# Patient Record
Sex: MALE | Race: WHITE | URBAN - METROPOLITAN AREA
[De-identification: names, ages, dates, MRNs, and addresses within clinical notes are randomized per-mention and may not be internally consistent; named-entity substitution may affect disease eponyms.]

---

## 2022-11-03 ENCOUNTER — APPOINTMENT (RX ONLY)
Dept: URBAN - METROPOLITAN AREA CLINIC 49 | Facility: CLINIC | Age: 42
Setting detail: DERMATOLOGY
End: 2022-11-03

## 2022-11-03 DIAGNOSIS — L0391 CELLULITIS AND ABSCESS OF UNSPECIFIED SITES: ICD-10-CM

## 2022-11-03 DIAGNOSIS — L0390 CELLULITIS AND ABSCESS OF UNSPECIFIED SITES: ICD-10-CM

## 2022-11-03 PROBLEM — L02.91 CUTANEOUS ABSCESS, UNSPECIFIED: Status: ACTIVE | Noted: 2022-11-03

## 2022-11-03 PROCEDURE — 99203 OFFICE O/P NEW LOW 30 MIN: CPT

## 2022-11-03 PROCEDURE — ? COUNSELING

## 2022-11-03 PROCEDURE — ? PRESCRIPTION MEDICATION MANAGEMENT

## 2022-11-03 PROCEDURE — ? PRESCRIPTION

## 2022-11-03 PROCEDURE — ? MEDICATION COUNSELING

## 2022-11-03 RX ORDER — CEPHALEXIN 500 MG/1
TABLET ORAL BID
Qty: 28 | Refills: 3 | Status: ERX | COMMUNITY
Start: 2022-11-03

## 2022-11-03 RX ADMIN — CEPHALEXIN: 500 TABLET ORAL at 00:00

## 2022-11-03 NOTE — HPI: CYST
How Severe Is Your Cyst?: mild
Is This A New Presentation, Or A Follow-Up?: Cyst
Additional History: Spot drained 10 days ago.

## 2022-11-03 NOTE — PROCEDURE: MEDICATION COUNSELING
Well , 10 Years Old  Well-child exams are recommended visits with a health care provider to track your child's growth and development at certain ages. This sheet tells you what to expect during this visit.  Recommended immunizations  · Tetanus and diphtheria toxoids and acellular pertussis (Tdap) vaccine. Children 7 years and older who are not fully immunized with diphtheria and tetanus toxoids and acellular pertussis (DTaP) vaccine:  ? Should receive 1 dose of Tdap as a catch-up vaccine. It does not matter how long ago the last dose of tetanus and diphtheria toxoid-containing vaccine was given.  ? Should receive the tetanus diphtheria (Td) vaccine if more catch-up doses are needed after the 1 Tdap dose.  · Your child may get doses of the following vaccines if needed to catch up on missed doses:  ? Hepatitis B vaccine.  ? Inactivated poliovirus vaccine.  ? Measles, mumps, and rubella (MMR) vaccine.  ? Varicella vaccine.  · Your child may get doses of the following vaccines if he or she has certain high-risk conditions:  ? Pneumococcal conjugate (PCV13) vaccine.  ? Pneumococcal polysaccharide (PPSV23) vaccine.  · Influenza vaccine (flu shot). A yearly (annual) flu shot is recommended.  · Hepatitis A vaccine. Children who did not receive the vaccine before 2 years of age should be given the vaccine only if they are at risk for infection, or if hepatitis A protection is desired.  · Meningococcal conjugate vaccine. Children who have certain high-risk conditions, are present during an outbreak, or are traveling to a country with a high rate of meningitis should be given this vaccine.  · Human papillomavirus (HPV) vaccine. Children should receive 2 doses of this vaccine when they are 11-12 years old. In some cases, the doses may be started at age 9 years. The second dose should be given 6-12 months after the first dose.  Your child may receive vaccines as individual doses or as more than one vaccine together  in one shot (combination vaccines). Talk with your child's health care provider about the risks and benefits of combination vaccines.  Testing  Vision  · Have your child's vision checked every 2 years, as long as he or she does not have symptoms of vision problems. Finding and treating eye problems early is important for your child's learning and development.  · If an eye problem is found, your child may need to have his or her vision checked every year (instead of every 2 years). Your child may also:  ? Be prescribed glasses.  ? Have more tests done.  ? Need to visit an eye specialist.  Other tests    · Your child's blood sugar (glucose) and cholesterol will be checked.  · Your child should have his or her blood pressure checked at least once a year.  · Talk with your child's health care provider about the need for certain screenings. Depending on your child's risk factors, your child's health care provider may screen for:  ? Hearing problems.  ? Low red blood cell count (anemia).  ? Lead poisoning.  ? Tuberculosis (TB).  · Your child's health care provider will measure your child's BMI (body mass index) to screen for obesity.  · If your child is female, her health care provider may ask:  ? Whether she has begun menstruating.  ? The start date of her last menstrual cycle.  General instructions  Parenting tips    · Even though your child is more independent than before, he or she still needs your support. Be a positive role model for your child, and stay actively involved in his or her life.  · Talk to your child about:  ? Peer pressure and making good decisions.  ? Bullying. Instruct your child to tell you if he or she is bullied or feels unsafe.  ? Handling conflict without physical violence. Help your child learn to control his or her temper and get along with siblings and friends.  ? The physical and emotional changes of puberty, and how these changes occur at different times in different children.  ? Sex.  Answer questions in clear, correct terms.  ? His or her daily events, friends, interests, challenges, and worries.  · Talk with your child's teacher on a regular basis to see how your child is performing in school.  · Give your child chores to do around the house.  · Set clear behavioral boundaries and limits. Discuss consequences of good and bad behavior.  · Correct or discipline your child in private. Be consistent and fair with discipline.  · Do not hit your child or allow your child to hit others.  · Acknowledge your child's accomplishments and improvements. Encourage your child to be proud of his or her achievements.  · Teach your child how to handle money. Consider giving your child an allowance and having your child save his or her money for something special.  Oral health  · Your child will continue to lose his or her baby teeth. Permanent teeth should continue to come in.  · Continue to monitor your child's tooth brushing and encourage regular flossing.  · Schedule regular dental visits for your child. Ask your child's dentist if your child:  ? Needs sealants on his or her permanent teeth.  ? Needs treatment to correct his or her bite or to straighten his or her teeth.  · Give fluoride supplements as told by your child's health care provider.  Sleep  · Children this age need 9-12 hours of sleep a day. Your child may want to stay up later, but still needs plenty of sleep.  · Watch for signs that your child is not getting enough sleep, such as tiredness in the morning and lack of concentration at school.  · Continue to keep bedtime routines. Reading every night before bedtime may help your child relax.  · Try not to let your child watch TV or have screen time before bedtime.  What's next?  Your next visit will take place when your child is 11 years old.  Summary  · Your child's blood sugar (glucose) and cholesterol will be tested at this age.  · Ask your child's dentist if your child needs treatment to  correct his or her bite or to straighten his or her teeth.  · Children this age need 9-12 hours of sleep a day. Your child may want to stay up later but still needs plenty of sleep. Watch for tiredness in the morning and lack of concentration at school.  · Teach your child how to handle money. Consider giving your child an allowance and having your child save his or her money for something special.  This information is not intended to replace advice given to you by your health care provider. Make sure you discuss any questions you have with your health care provider.  Document Released: 01/07/2008 Document Revised: 04/07/2020 Document Reviewed: 09/13/2019  [employer]vier Patient Education © 2020 [employer]     Tetracycline Counseling: Patient counseled regarding possible photosensitivity and increased risk for sunburn.  Patient instructed to avoid sunlight, if possible.  When exposed to sunlight, patients should wear protective clothing, sunglasses, and sunscreen.  The patient was instructed to call the office immediately if the following severe adverse effects occur:  hearing changes, easy bruising/bleeding, severe headache, or vision changes.  The patient verbalized understanding of the proper use and possible adverse effects of tetracycline.  All of the patient's questions and concerns were addressed. Patient understands to avoid pregnancy while on therapy due to potential birth defects.

## 2022-11-17 ENCOUNTER — RX ONLY (OUTPATIENT)
Age: 42
Setting detail: RX ONLY
End: 2022-11-17

## 2022-11-17 RX ORDER — CEPHALEXIN 500 MG/1
CAPSULE ORAL
Qty: 20 | Refills: 0 | Status: ERX | COMMUNITY
Start: 2022-11-17

## 2022-12-01 ENCOUNTER — APPOINTMENT (RX ONLY)
Dept: URBAN - METROPOLITAN AREA CLINIC 49 | Facility: CLINIC | Age: 42
Setting detail: DERMATOLOGY
End: 2022-12-01

## 2022-12-01 DIAGNOSIS — L0390 CELLULITIS AND ABSCESS OF UNSPECIFIED SITES: ICD-10-CM

## 2022-12-01 DIAGNOSIS — L0391 CELLULITIS AND ABSCESS OF UNSPECIFIED SITES: ICD-10-CM

## 2022-12-01 PROBLEM — L02.01 CUTANEOUS ABSCESS OF FACE: Status: ACTIVE | Noted: 2022-12-01

## 2022-12-01 PROCEDURE — ? COUNSELING

## 2022-12-01 PROCEDURE — 10060 I&D ABSCESS SIMPLE/SINGLE: CPT

## 2022-12-01 PROCEDURE — ? INCISION AND DRAINAGE

## 2022-12-01 PROCEDURE — ? PRESCRIPTION

## 2022-12-01 RX ORDER — CEPHALEXIN 500 MG/1
TABLET ORAL BID
Qty: 14 | Refills: 0 | Status: ERX

## 2022-12-01 ASSESSMENT — LOCATION DETAILED DESCRIPTION DERM: LOCATION DETAILED: RIGHT CENTRAL MANDIBULAR CHEEK

## 2022-12-01 ASSESSMENT — LOCATION SIMPLE DESCRIPTION DERM: LOCATION SIMPLE: RIGHT CHEEK

## 2022-12-01 ASSESSMENT — LOCATION ZONE DERM: LOCATION ZONE: FACE

## 2024-10-04 NOTE — PROCEDURE: MEDICATION COUNSELING
Private car Zyclara Pregnancy And Lactation Text: This medication is Pregnancy Category C. It is unknown if this medication is excreted in breast milk.